# Patient Record
Sex: MALE | Race: WHITE | NOT HISPANIC OR LATINO | Employment: FULL TIME | URBAN - METROPOLITAN AREA
[De-identification: names, ages, dates, MRNs, and addresses within clinical notes are randomized per-mention and may not be internally consistent; named-entity substitution may affect disease eponyms.]

---

## 2018-11-08 ENCOUNTER — OFFICE VISIT (OUTPATIENT)
Dept: URGENT CARE | Facility: CLINIC | Age: 33
End: 2018-11-08
Payer: OTHER MISCELLANEOUS

## 2018-11-08 VITALS
DIASTOLIC BLOOD PRESSURE: 87 MMHG | BODY MASS INDEX: 27.16 KG/M2 | TEMPERATURE: 97 F | SYSTOLIC BLOOD PRESSURE: 139 MMHG | RESPIRATION RATE: 18 BRPM | HEART RATE: 89 BPM | OXYGEN SATURATION: 98 % | WEIGHT: 194 LBS | HEIGHT: 71 IN

## 2018-11-08 DIAGNOSIS — S81.812A LACERATION OF LEFT LOWER EXTREMITY, INITIAL ENCOUNTER: Primary | ICD-10-CM

## 2018-11-08 PROCEDURE — 12002 RPR S/N/AX/GEN/TRNK2.6-7.5CM: CPT | Mod: S$GLB,,, | Performed by: PHYSICIAN ASSISTANT

## 2018-11-08 PROCEDURE — 99204 OFFICE O/P NEW MOD 45 MIN: CPT | Mod: 25,S$GLB,, | Performed by: PHYSICIAN ASSISTANT

## 2018-11-08 NOTE — LETTER
[facility] [address], [address]  [facility] [address]  Phone: [phone]  Fax: [phone]  [facility]: [email or web address]    Pt Name: Rigo Garduno  Injury Date: 11/08/2018   Employee ID:  Date of First Treatment: 11/08/2018   Company: [employer]      Appointment Time: 05:25 PM Arrived: [id number]   Provider: Octavio Monique PA-C Time Out:2965     Office Treatment:   1. Laceration of left lower extremity, initial encounter                     Return Appointment: 1 week for suture removal  RTW as tolerated with attention not to aggravate.

## 2018-11-08 NOTE — PROGRESS NOTES
"Subjective:       Patient ID: Rigo Garduno is a 33 y.o. male.    Vitals:  height is 5' 11" (1.803 m) and weight is 88 kg (194 lb). His oral temperature is 97 °F (36.1 °C). His blood pressure is 139/87 and his pulse is 89. His respiration is 18 and oxygen saturation is 98%.     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 The laceration is located on the left leg. The laceration mechanism was a blunt object. His tetanus status is UTD.     Review of Systems   Constitution: Negative for chills and fever.   HENT: Negative for sore throat.    Eyes: Negative for blurred vision.   Cardiovascular: Negative for chest pain.   Respiratory: Negative for shortness of breath.    Skin: Negative for rash.   Musculoskeletal: Negative for back pain and joint pain.   Gastrointestinal: Negative for abdominal pain, diarrhea, nausea and vomiting.   Neurological: Negative for headaches.   Psychiatric/Behavioral: The patient is not nervous/anxious.        Objective:      Physical Exam   Constitutional: He is oriented to person, place, and time. He appears well-developed and well-nourished. He is cooperative.  Non-toxic appearance. He does not appear ill. No distress.   HENT:   Head: Normocephalic and atraumatic.   Right Ear: Hearing, tympanic membrane, external ear and ear canal normal.   Left Ear: Hearing, tympanic membrane, external ear and ear canal normal.   Nose: Nose normal. No mucosal edema, rhinorrhea or nasal deformity. No epistaxis. Right sinus exhibits no maxillary sinus tenderness and no frontal sinus tenderness. Left sinus exhibits no maxillary sinus tenderness and no frontal sinus tenderness.   Mouth/Throat: Uvula is midline, oropharynx is clear and moist and mucous membranes are normal. No trismus in the jaw. Normal dentition. No uvula swelling. No posterior oropharyngeal erythema.   Eyes: Conjunctivae and lids " are normal. Right eye exhibits no discharge. Left eye exhibits no discharge. No scleral icterus.   Sclera clear bilat   Neck: Trachea normal, normal range of motion, full passive range of motion without pain and phonation normal. Neck supple.   Cardiovascular: Normal rate, regular rhythm, normal heart sounds, intact distal pulses and normal pulses.   Pulmonary/Chest: Effort normal and breath sounds normal. No respiratory distress.   Abdominal: Soft. Normal appearance and bowel sounds are normal. He exhibits no distension, no pulsatile midline mass and no mass. There is no tenderness.   Musculoskeletal: Normal range of motion. He exhibits no edema or deformity.   Neurological: He is alert and oriented to person, place, and time. He exhibits normal muscle tone. Coordination normal.   Skin: Skin is warm and dry. Laceration noted. He is not diaphoretic. No pallor.        Psychiatric: He has a normal mood and affect. His speech is normal and behavior is normal. Judgment and thought content normal. Cognition and memory are normal.   Nursing note and vitals reviewed.      Assessment:       1. Laceration of left lower extremity, initial encounter        Plan:         Laceration of left lower extremity, initial encounter  -     Laceration Repair    Procedures    Laceration Repair  Date/Time: 2018 6:21 PM  Performed by: Octavio Monique PA-C  Authorized by: Octavio Monique PA-C   Consent Done: Yes  Consent: Verbal consent obtained.  Consent given by: patient  Patient identity confirmed:  and name  Body area: lower extremity  Location details: left lower leg  Laceration length: 4 cm  Tendon involvement: none  Nerve involvement: none  Vascular damage: no  Anesthesia: local infiltration    Anesthesia:  Local Anesthetic: lidocaine 1% without epinephrine  Anesthetic total: 4 mL  Preparation: Patient was prepped and draped in the usual sterile fashion.  Irrigation solution: saline  Irrigation method: syringe  Amount of  cleaning: standard  Debridement: none  Degree of undermining: none  Skin closure: 3-0 nylon  Number of sutures: 3  Technique: simple  Approximation: close  Approximation difficulty: simple  Dressing: adhesive bandage  Patient tolerance: Patient tolerated the procedure well with no immediate complications           Patient Instructions       Self-Care for Cuts, Scrapes, and Burns  Cuts, scrapes, and burns are hard to avoid. Most minor injuries can be treated at home. A small wound may threaten your health if it causes severe blood loss or becomes infected. Call your doctor if a wound doesnt heal within a couple of weeks.  When should I call my healthcare provider?  Call your healthcare provider right away if:  · You cant stop the bleeding.  · The wound covers a large area, is deep, or you can see muscles, tendons or bones.  · Your ear or eye is injured or burned.  · The burn is larger than the size of your palm, or is on your neck, face, foot, groin, or your hand.  · A puncture wound is deep or wide, or was caused by a dirty or farnaz object.  · You have signs of infection: fever, pus, pain, or redness.  · It has been 10 years or more since your last tetanus shot.   Caring for cuts, scrapes, and puncture wounds  If youre caring for someone else, remember to protect yourself from illnesses carried in blood and body fluids. Use latex gloves or whatever else is available (a towel, perhaps) as a barrier between you and the blood.  Step 1. Control bleeding  · Apply direct pressure to a cut or scrape to stop bleeding.  · Allow a minor puncture wound to stop bleeding on its own, unless the bleeding is heavy. This may help clean out the wound.  Step 2. Clean the wound  · Kill germs and remove the dirt by washing the wound with warm water and soap.  · Soak a minor puncture wound in warm, sudsy water for several minutes. Repeat this at least 2 times every day.  Step 3. Cover the injury  · Hold the edges of a cut together  with a butterfly bandage.  · Apply antibiotic ointment.  · For a cut or scrape, apply an adhesive bandage or clean gauze. Tape it in place.  · Cover a minor puncture with gauze to absorb drainage and let in air to help with healing.  Treating minor burns  · Cool the burn immediately. Otherwise, the skin continues to hold heat and will keep burning. Use cloths soaked in cool water, place the burned area under a gentle stream of cool water, or submerge the burn in a full sink or bucket.  · Treat a minor burn like you treat a minor cut or scrape. Clean and cover it with a loose dressing.  · Do not put butter, oil, or ointment on a burn. This only seals in heat. After you cool the area, you can apply a moisturizer with Aloe Vera (with or without a numbing agent) to soothe the burn.  · Dont break blisters or pull off skin from a broken blister. This skin helps protect the healing skin underneath.  Date Last Reviewed: 12/1/2016 © 2000-2017 [employer]. [address] [place] [address]. All rights reserved. This information is not intended as a substitute for professional medical care. Always follow your healthcare professional's instructions.    If you were prescribed a narcotic medication, do not drive or operate heavy equipment or machinery while taking these medications.  You must understand that you've received an Urgent Care treatment only and that you may be released before all your medical problems are known or treated. You, the patient, will arrange for follow up care as instructed.  Follow up with your PCP or specialty clinic as directed in the next 1-2 weeks if not improved or as needed.  You can call [phone] to schedule an appointment with the appropriate provider.  If your condition worsens we recommend that you receive another evaluation at the emergency room immediately or contact your primary medical clinics after hours call service to discuss your concerns.  Please return  here or go to the Emergency Department for any concerns or worsening of condition.

## 2018-11-08 NOTE — PROGRESS NOTES
Subjective:       Patient ID: Rigo Garduno is a 33 y.o. male.    Vitals:  vitals were not taken for this visit.     Chief Complaint: Laceration    Laceration        ROS    Objective:      Physical Exam    Assessment:       No diagnosis found.    Plan:         There are no diagnoses linked to this encounter.

## 2018-11-09 NOTE — PROCEDURES
Laceration Repair  Date/Time: 2018 6:21 PM  Performed by: Octavio Monique PA-C  Authorized by: Octavio Monique PA-C   Consent Done: Yes  Consent: Verbal consent obtained.  Consent given by: patient  Patient identity confirmed:  and name  Body area: lower extremity  Location details: left lower leg  Laceration length: 4 cm  Tendon involvement: none  Nerve involvement: none  Vascular damage: no  Anesthesia: local infiltration    Anesthesia:  Local Anesthetic: lidocaine 1% without epinephrine  Anesthetic total: 4 mL  Preparation: Patient was prepped and draped in the usual sterile fashion.  Irrigation solution: saline  Irrigation method: syringe  Amount of cleaning: standard  Debridement: none  Degree of undermining: none  Skin closure: 3-0 nylon  Number of sutures: 3  Technique: simple  Approximation: close  Approximation difficulty: simple  Dressing: adhesive bandage  Patient tolerance: Patient tolerated the procedure well with no immediate complications

## 2018-11-09 NOTE — PATIENT INSTRUCTIONS
Self-Care for Cuts, Scrapes, and Burns  Cuts, scrapes, and burns are hard to avoid. Most minor injuries can be treated at home. A small wound may threaten your health if it causes severe blood loss or becomes infected. Call your doctor if a wound doesnt heal within a couple of weeks.  When should I call my healthcare provider?  Call your healthcare provider right away if:  · You cant stop the bleeding.  · The wound covers a large area, is deep, or you can see muscles, tendons or bones.  · Your ear or eye is injured or burned.  · The burn is larger than the size of your palm, or is on your neck, face, foot, groin, or your hand.  · A puncture wound is deep or wide, or was caused by a dirty or farnaz object.  · You have signs of infection: fever, pus, pain, or redness.  · It has been 10 years or more since your last tetanus shot.   Caring for cuts, scrapes, and puncture wounds  If youre caring for someone else, remember to protect yourself from illnesses carried in blood and body fluids. Use latex gloves or whatever else is available (a towel, perhaps) as a barrier between you and the blood.  Step 1. Control bleeding  · Apply direct pressure to a cut or scrape to stop bleeding.  · Allow a minor puncture wound to stop bleeding on its own, unless the bleeding is heavy. This may help clean out the wound.  Step 2. Clean the wound  · Kill germs and remove the dirt by washing the wound with warm water and soap.  · Soak a minor puncture wound in warm, sudsy water for several minutes. Repeat this at least 2 times every day.  Step 3. Cover the injury  · Hold the edges of a cut together with a butterfly bandage.  · Apply antibiotic ointment.  · For a cut or scrape, apply an adhesive bandage or clean gauze. Tape it in place.  · Cover a minor puncture with gauze to absorb drainage and let in air to help with healing.  Treating minor burns  · Cool the burn immediately. Otherwise, the skin continues to hold heat and will keep  burning. Use cloths soaked in cool water, place the burned area under a gentle stream of cool water, or submerge the burn in a full sink or bucket.  · Treat a minor burn like you treat a minor cut or scrape. Clean and cover it with a loose dressing.  · Do not put butter, oil, or ointment on a burn. This only seals in heat. After you cool the area, you can apply a moisturizer with Aloe Vera (with or without a numbing agent) to soothe the burn.  · Dont break blisters or pull off skin from a broken blister. This skin helps protect the healing skin underneath.  Date Last Reviewed: 12/1/2016  © 5931-6168 [employer]. [address] [place] [address], [place] [address]. All rights reserved. This information is not intended as a substitute for professional medical care. Always follow your healthcare professional's instructions.    If you were prescribed a narcotic medication, do not drive or operate heavy equipment or machinery while taking these medications.  You must understand that you've received an Urgent Care treatment only and that you may be released before all your medical problems are known or treated. You, the patient, will arrange for follow up care as instructed.  Follow up with your PCP or specialty clinic as directed in the next 1-2 weeks if not improved or as needed.  You can call [phone] to schedule an appointment with the appropriate provider.  If your condition worsens we recommend that you receive another evaluation at the emergency room immediately or contact your primary medical clinics after hours call service to discuss your concerns.  Please return here or go to the Emergency Department for any concerns or worsening of condition.

## 2018-11-15 ENCOUNTER — OCCUPATIONAL HEALTH (OUTPATIENT)
Dept: URGENT CARE | Facility: CLINIC | Age: 33
End: 2018-11-15
Payer: OTHER MISCELLANEOUS

## 2018-11-15 VITALS
RESPIRATION RATE: 18 BRPM | DIASTOLIC BLOOD PRESSURE: 90 MMHG | SYSTOLIC BLOOD PRESSURE: 136 MMHG | WEIGHT: 194 LBS | TEMPERATURE: 98 F | BODY MASS INDEX: 27.16 KG/M2 | HEIGHT: 71 IN | OXYGEN SATURATION: 98 % | HEART RATE: 83 BPM

## 2018-11-15 DIAGNOSIS — S81.812D LACERATION OF LEFT LOWER LEG, SUBSEQUENT ENCOUNTER: Primary | ICD-10-CM

## 2018-11-15 DIAGNOSIS — Z48.02 VISIT FOR SUTURE REMOVAL: ICD-10-CM

## 2018-11-15 PROCEDURE — 99024 POSTOP FOLLOW-UP VISIT: CPT | Mod: S$GLB,,, | Performed by: FAMILY MEDICINE

## 2018-11-16 NOTE — PROGRESS NOTES
Subjective:       Patient ID: Rigo Garduno is a 33 y.o. male.    Chief Complaint: Suture / Staple Removal    Pt here for stitch removal and follow up from work injury. Seen on 11/08      Suture / Staple Removal   The sutures were placed 7 to 10 days ago. He tried regular soap and water washings since the wound repair. The treatment provided significant relief. There has been no drainage from the wound. There is no redness present. There is no swelling present. There is no pain present.     Review of Systems   Constitution: Negative for chills and fever.   HENT: Negative for sore throat.    Eyes: Negative for blurred vision.   Cardiovascular: Negative for chest pain.   Respiratory: Negative for shortness of breath.    Skin: Negative for rash.   Musculoskeletal: Negative for back pain and joint pain.   Gastrointestinal: Negative for abdominal pain, diarrhea, nausea and vomiting.   Neurological: Negative for headaches.   Psychiatric/Behavioral: The patient is not nervous/anxious.        Objective:      Physical Exam      Wound healing well. 3 sutures removed. Steri strip placed.   Assessment:       1. Laceration of left lower leg, subsequent encounter    2. Visit for suture removal        Plan:              rtw full duty     No Follow-up on file.

## 2021-05-06 ENCOUNTER — PATIENT MESSAGE (OUTPATIENT)
Dept: RESEARCH | Facility: HOSPITAL | Age: 36
End: 2021-05-06

## 2022-07-15 ENCOUNTER — TELEPHONE (OUTPATIENT)
Dept: GASTROENTEROLOGY | Facility: CLINIC | Age: 37
End: 2022-07-15
Payer: COMMERCIAL

## 2022-07-15 NOTE — TELEPHONE ENCOUNTER
----- Message from Anna Rabago sent at 7/15/2022 12:00 PM CDT -----  Regarding: appointment  Contact: patient  Type:  Sooner Appointment Request    Caller is requesting a sooner appointment.  Caller declined first available appointment listed below.  Caller will not accept being placed on the waitlist and is requesting a message be sent to doctor.    Name of Caller:  patient  When is the first available appointment?  08/08/22  Symptoms:  ER [facility] 07/14/22 internal hemroid  Best Call Back Number:  [phone] (home)   Additional Information:  Patient needs to be seen in one week. Please call patient to advise.Thanks!

## 2022-07-15 NOTE — TELEPHONE ENCOUNTER
Returned call to the patient, patient offered first available appointment, appointment scheduled, patient verbalized understanding of this.

## 2022-08-11 ENCOUNTER — OFFICE VISIT (OUTPATIENT)
Dept: GASTROENTEROLOGY | Facility: CLINIC | Age: 37
End: 2022-08-11
Payer: COMMERCIAL

## 2022-08-11 ENCOUNTER — TELEPHONE (OUTPATIENT)
Dept: GASTROENTEROLOGY | Facility: CLINIC | Age: 37
End: 2022-08-11

## 2022-08-11 VITALS — HEIGHT: 71 IN | BODY MASS INDEX: 25.37 KG/M2 | WEIGHT: 181.19 LBS

## 2022-08-11 DIAGNOSIS — K62.89 RECTAL DISCOMFORT: ICD-10-CM

## 2022-08-11 DIAGNOSIS — K59.00 CONSTIPATION, UNSPECIFIED CONSTIPATION TYPE: Primary | ICD-10-CM

## 2022-08-11 DIAGNOSIS — K62.9 ANAL LESION: ICD-10-CM

## 2022-08-11 DIAGNOSIS — Z87.19 HISTORY OF HEMORRHOIDS: ICD-10-CM

## 2022-08-11 PROCEDURE — 99999 PR PBB SHADOW E&M-EST. PATIENT-LVL III: CPT | Mod: PBBFAC,,, | Performed by: NURSE PRACTITIONER

## 2022-08-11 PROCEDURE — 3008F PR BODY MASS INDEX (BMI) DOCUMENTED: ICD-10-PCS | Mod: CPTII,S$GLB,, | Performed by: NURSE PRACTITIONER

## 2022-08-11 PROCEDURE — 99203 PR OFFICE/OUTPT VISIT, NEW, LEVL III, 30-44 MIN: ICD-10-PCS | Mod: S$GLB,,, | Performed by: NURSE PRACTITIONER

## 2022-08-11 PROCEDURE — 99203 OFFICE O/P NEW LOW 30 MIN: CPT | Mod: S$GLB,,, | Performed by: NURSE PRACTITIONER

## 2022-08-11 PROCEDURE — 1160F RVW MEDS BY RX/DR IN RCRD: CPT | Mod: CPTII,S$GLB,, | Performed by: NURSE PRACTITIONER

## 2022-08-11 PROCEDURE — 4010F ACE/ARB THERAPY RXD/TAKEN: CPT | Mod: CPTII,S$GLB,, | Performed by: NURSE PRACTITIONER

## 2022-08-11 PROCEDURE — 1159F MED LIST DOCD IN RCRD: CPT | Mod: CPTII,S$GLB,, | Performed by: NURSE PRACTITIONER

## 2022-08-11 PROCEDURE — 4010F PR ACE/ARB THEARPY RXD/TAKEN: ICD-10-PCS | Mod: CPTII,S$GLB,, | Performed by: NURSE PRACTITIONER

## 2022-08-11 PROCEDURE — 99999 PR PBB SHADOW E&M-EST. PATIENT-LVL III: ICD-10-PCS | Mod: PBBFAC,,, | Performed by: NURSE PRACTITIONER

## 2022-08-11 PROCEDURE — 1160F PR REVIEW ALL MEDS BY PRESCRIBER/CLIN PHARMACIST DOCUMENTED: ICD-10-PCS | Mod: CPTII,S$GLB,, | Performed by: NURSE PRACTITIONER

## 2022-08-11 PROCEDURE — 1159F PR MEDICATION LIST DOCUMENTED IN MEDICAL RECORD: ICD-10-PCS | Mod: CPTII,S$GLB,, | Performed by: NURSE PRACTITIONER

## 2022-08-11 PROCEDURE — 3008F BODY MASS INDEX DOCD: CPT | Mod: CPTII,S$GLB,, | Performed by: NURSE PRACTITIONER

## 2022-08-11 RX ORDER — HYDROCORTISONE 25 MG/G
CREAM TOPICAL 2 TIMES DAILY
COMMUNITY

## 2022-08-11 RX ORDER — POLYETHYLENE GLYCOL 3350 17 G/17G
17 POWDER, FOR SOLUTION ORAL DAILY
Qty: 510 G | Refills: 2 | Status: SHIPPED | OUTPATIENT
Start: 2022-08-11 | End: 2022-09-10

## 2022-08-11 NOTE — TELEPHONE ENCOUNTER
Called [facility] to [facility] [address]. Pharmacist will hold Rx and patient should call for Rx when it gets closer to time for colonoscopy on 11/10/22.

## 2022-08-11 NOTE — PATIENT INSTRUCTIONS
Constipation (Adult)  Constipation means that you have bowel movements that are less frequent than usual. Stools often become very hard and difficult to pass.  Constipation is very common. At some point in life it affects almost everyone. Since everyone's bowel habits are different, what is constipation to one person may not be to another. Your healthcare provider may do tests to diagnose constipation. It depends on what he or she finds when evaluating you.    Symptoms of constipation include:  Abdominal pain  Bloating  Vomiting  Painful bowel movements  Itching, swelling, bleeding, or pain around the anus  Causes  Constipation can have many causes. These include:  Diet low in fiber  Too much dairy  Not drinking enough liquids  Lack of exercise or physical activity. This is especially true for older adults.  Changes in lifestyle or daily routine, including pregnancy, aging, work, and travel  Frequent use or misuse of laxatives  Ignoring the urge to have a bowel movement or delaying it until later  Medicines, such as certain prescription pain medicines, iron supplements, antacids, certain antidepressants, and calcium supplements  Diseases like irritable bowel syndrome, bowel obstructions, stroke, diabetes, thyroid disease, Parkinson disease, hemorrhoids, and colon cancer  Complications  Potential complications of constipation can include:  Hemorrhoids  Rectal bleeding from hemorrhoids or anal fissures (skin tears)  Hernias  Dependency on laxatives  Chronic constipation  Fecal impaction  Bowel obstruction or perforation  Home care  All treatment should be done after talking with your healthcare provider. This is especially true if you have another medical problems, are taking prescription medicines, or are an older adult. Treatment most often involves lifestyle changes. You may also need medicines. Your healthcare provider will tell you which will work best for you. Follow the advice below to help avoid this problem  in the future.  Lifestyle changes  These lifestyle changes can help prevent constipation:  Diet. Eat a high-fiber diet, with fresh fruit and vegetables, and reduce dairy intake, meats, and processed foods  Fluids. It's important to get enough fluids each day. Drink plenty of water when you eat more fiber. If you are on diet that limits the amount of fluid you can have, talk about this with your healthcare provider.  Regular exercise. Check with your healthcare provider first.  Medications  Take any medicines as directed. Some laxatives are safe to use only every now and then. Others can be taken on a regular basis. Talk with your doctor or pharmacist if you have questions.  Prescription pain medicines can cause constipation. If you are taking this kind of medicine, ask your healthcare provider if you should also take a stool softener.  Medicines you may take to treat constipation include:  Fiber supplements  Stool softeners  Laxatives  Enemas  Rectal suppositories  Follow-up care  Follow up with your healthcare provider if symptoms don't get better in the next few days. You may need to have more tests or see a specialist.  Call 911  Call 911 if any of these occur:  Trouble breathing  Stiff, rigid abdomen that is severely painful to touch  Confusion  Fainting or loss of consciousness  Rapid heart rate  Chest pain  When to seek medical advice  Call your healthcare provider right away if any of these occur:  Fever over 100.4°F (38°C)  Failure to resume normal bowel movements  Pain in your abdomen or back gets worse  Nausea or vomiting  Swelling in your abdomen  Blood in the stool  Black, tarry stool  Involuntary weight loss  Weakness  Date Last Reviewed: 12/30/2015  © 0047-8021 [employer], [employer]. [address] [place] [address], [place] [address]. All rights reserved. This information is not intended as a substitute for professional medical care. Always follow your healthcare professional's instructions.   Abdominal  Pain    Abdominal pain is pain in the stomach or belly area. Everyone has this pain from time to time. In many cases it goes away on its own. But abdominal pain can sometimes be due to a serious problem, such as appendicitis. So its important to know when to seek help.  Causes of abdominal pain  There are many possible causes of abdominal pain. Common causes in adults include:  Constipation, diarrhea, or gas  Stomach acid flowing back up into the esophagus (acid reflux or heartburn)  Severe acid reflux, called GERD (gastroesophageal reflux disease)  A sore in the lining of the stomach or small intestine (peptic ulcer)  Inflammation of the gallbladder, liver, or pancreas  Gallstones or kidney stones  Appendicitis   Intestinal blockage   An internal organ pushing through a muscle or other tissue (hernia)  Urinary tract infections  In women, menstrual cramps, fibroids, or endometriosis  Inflammation or infection of the intestines  Diagnosing the cause of abdominal pain  Your healthcare provider will do a physical exam help find the cause of your pain. If needed, tests will be ordered. Belly pain has many possible causes. So it can be hard to find the reason for your pain. Giving details about your pain can help. Tell your provider where and when you feel the pain, and what makes it better or worse. Also let your provider know if you have other symptoms such as:  Fever  Tiredness  Upset stomach (nausea)  Vomiting  Changes in bathroom habits  Treating abdominal pain  Some causes of pain need emergency medical treatment right away. These include appendicitis or a bowel blockage. Other problems can be treated with rest, fluids, or medicines. Your healthcare provider can give you specific instructions for treatment or self-care based on what is causing your pain.  If you have vomiting or diarrhea, sip water or other clear fluids. When you are ready to eat solid foods again, start with small amounts of easy-to-digest,  low-fat foods. These include apple sauce, toast, or crackers.   When to seek medical care  Call 911 or go to the hospital right away if you:  Cant pass stool and are vomiting  Are vomiting blood or have bloody diarrhea or black, tarry diarrhea  Have chest, neck, or shoulder pain  Feel like you might pass out  Have pain in your shoulder blades with nausea  Have sudden, severe belly pain  Have new, severe pain unlike any you have felt before  Have a belly that is rigid, hard, and tender to touch  Call your healthcare provider if you have:  Pain for more than 5 days  Bloating for more than 2 days  Diarrhea for more than 5 days  A fever of 100.4°F (38.0°C) or higher, or as directed by your provider  Pain that gets worse  Weight loss for no reason  Continued lack of appetite  Blood in your stool  How to prevent abdominal pain  Here are some tips to help prevent abdominal pain:  Eat smaller amounts of food at one time.  Avoid greasy, fried, or other high-fat foods.  Avoid foods that give you gas.  Exercise regularly.  Drink plenty of fluids.  To help prevent GERD symptoms:  Quit smoking.  Reduce alcohol and certain foods that increase stomach acid.  Avoid aspirin and over-the-counter pain and fever medicines (NSAIDS or nonsteroidal anti-inflammatory drugs), if possible  Lose extra weight.  Finish eating at least 2 hours before you go to bed or lie down.  Raise the head of your bed.  Date Last Reviewed: 7/1/2016  © 7387-8345 [employer]. [address] [place] [address], [place] [address]. All rights reserved. This information is not intended as a substitute for professional medical care. Always follow your healthcare professional's instructions.           Hemorrhoids    Hemorrhoids are swollen and inflamed veins inside the rectum and near the anus. The rectum is the last several inches of the colon. The anus is the passage between the rectum and the outside of the body.  Causes  The veins can become swollen due to  increased pressure in them. This is most often caused by:  Chronic constipation or diarrhea  Straining when having a bowel movement  Sitting too long on the toilet  A low-fiber diet  Pregnancy  Symptoms  Bleeding from the rectum (this may be noticeable after bowel movements)  Lump near the anus  Itching around the anus  Pain around the anus  There are different types of hemorrhoids. Depending on the type you have and the severity, you may be able to treat yourself at home. In some cases, a procedure may be the best treatment option. Your healthcare provider can tell you more about this, if needed.  Home care  General care  To get relief from pain or itching, try:  Topical products. Your healthcare provider may prescribe or recommend creams, ointments, or pads that can be applied to the hemorrhoid. Use these exactly as directed.  Medicines. Your healthcare provider may recommend stool softeners, suppositories, or laxatives to help manage constipation. Use these exactly as directed.  Sitz baths. A sitz bath involves sitting in a few inches of warm bath water. Be careful not to make the water so hot that you burn yourself--test it before sitting in it. Soak for about 10 to 15 minutes a few times a day. This may help relieve pain.  Tips to help prevent hemorrhoids  Eat more fiber. Fiber adds bulk to stool and absorbs water as it moves through your colon. This makes stool softer and easier to pass.  Increase the fiber in your diet with more fiber-rich foods. These include fresh fruit, vegetables, and whole grains.  Take a fiber supplement or bulking agent, if advised to by your provider. These include products such as psyllium or methylcellulose.  Drink plenty of water, if directed to by your provider. This can help keep stool soft.  Be more active. Frequent exercise aids digestion and helps prevent constipation. It may also help make bowel movements more regular.  Dont strain during bowel movements. This can make  hemorrhoids more likely. Also, dont sit on the toilet for long periods of time.  Follow-up care  Follow up with your healthcare provider, or as advised. If a culture or imaging tests were done, you will be notified of the results when they are ready. This may take a few days or longer.  When to seek medical advice  Call your healthcare provider right away if any of these occur:  Increased bleeding from the rectum  Increased pain around the rectum or anus  Weakness or dizziness  Call 911  Call 911 or return to the emergency department right away if any of these occur:  Trouble breathing or swallowing  Fainting or loss of consciousness  Unusually fast heart rate  Vomiting blood  Large amounts of blood in stool  Date Last Reviewed: 6/22/2015 © 2000-2017 [email or web address]. [address] [place] [address], [place] [address]. All rights reserved. This information is not intended as a substitute for professional medical care. Always follow your healthcare professional's instructions.

## 2022-08-11 NOTE — PROGRESS NOTES
Subjective:       Patient ID: Rigo Garduno is a 36 y.o. male Body mass index is 25.27 kg/m².    Chief Complaint: Constipation    This patient is new to me.     Patient reports he saws another GI group's Nurse practitioner previously and all they did was prescribe him a cream, which didn't help. Went to the ER for it, diagnosed with internal hemorrhoid and was prescribed proctofoam but was not able to get it due to his pharmacy not having it. Patient reports he would like to try a different pharmacy to try to fill the prescription for the foam but he needs a new prescription.    GI Problem  Primary symptoms do not include fever, weight loss, fatigue, abdominal pain, nausea, vomiting, diarrhea, melena, hematemesis, hematochezia or dysuria.   The illness is also significant for constipation (started ~1 year ago, bowel movements are once daily with taking metamucil TID, formed stool to pellet-like stool) and tenesmus. The illness does not include chills, dysphagia or odynophagia. Significant associated medical issues include hemorrhoids (hydrocortisone cream PRN- no relief; PAST TREATMENT: preparation H- no relief).     Review of Systems   Constitutional: Negative for appetite change, chills, fatigue, fever and weight loss.   HENT: Negative for sore throat and trouble swallowing.    Respiratory: Negative for cough, choking and shortness of breath.    Cardiovascular: Negative for chest pain.   Gastrointestinal: Positive for constipation (started ~1 year ago, bowel movements are once daily with taking metamucil TID, formed stool to pellet-like stool). Negative for abdominal pain, anal bleeding, blood in stool, diarrhea, dysphagia, hematemesis, hematochezia, melena, nausea, rectal pain (denies pain but mild discomfort and feels a nodule to his anus) and vomiting.        Patient reports he thinks he got hemorrhoids from working years at his current job, , due to frequent heavy lifting   Genitourinary: Negative  for difficulty urinating, dysuria and flank pain.   Neurological: Negative for weakness.       Past Medical History:   Diagnosis Date    Hypertension 5/27/2016     History reviewed. No pertinent surgical history.  Family History   Problem Relation Age of Onset    No Known Problems Mother     No Known Problems Father     Colon cancer Neg Hx     Crohn's disease Neg Hx     Ulcerative colitis Neg Hx      Social History     Tobacco Use    Smoking status: Former Smoker    Smokeless tobacco: Never Used   Substance Use Topics    Alcohol use: Yes     Alcohol/week: 12.0 standard drinks     Types: 12 Cans of beer per week    Drug use: Never     Wt Readings from Last 10 Encounters:   08/11/22 82.2 kg (181 lb 3.5 oz)   07/14/22 83.2 kg (183 lb 6.8 oz)   08/06/21 84.4 kg (186 lb 1.1 oz)   03/14/21 84.7 kg (186 lb 11.7 oz)   10/10/19 79.4 kg (175 lb)   11/15/18 88 kg (194 lb)   11/08/18 88 kg (194 lb)   05/27/16 88 kg (194 lb)     Reviewed prior medical records including 7/14/2022 ER visit note; 7/14/2022 occult stool negative, POCT CBC & CMP.    Objective:      Physical Exam  Vitals and nursing note reviewed.   Constitutional:       General: He is not in acute distress.     Appearance: Normal appearance. He is well-developed. He is not diaphoretic.   HENT:      Mouth/Throat:      Comments: Patient is wearing a face mask, which covers patient's mouth and nose, due to COVID 19 concerns.  Eyes:      General: No scleral icterus.     Conjunctiva/sclera: Conjunctivae normal.   Pulmonary:      Effort: Pulmonary effort is normal. No respiratory distress.      Breath sounds: Normal breath sounds. No wheezing.   Abdominal:      General: Bowel sounds are normal. There is no distension or abdominal bruit.      Palpations: Abdomen is soft. Abdomen is not rigid. There is no mass.      Tenderness: There is no abdominal tenderness. There is no guarding or rebound. Negative signs include Burgess's sign and McBurney's sign.      Comments:  Deferred rectal examination   Skin:     General: Skin is warm and dry.      Coloration: Skin is not pale.      Findings: No erythema or rash.      Comments: Non-jaundiced   Neurological:      Mental Status: He is alert and oriented to person, place, and time.   Psychiatric:         Behavior: Behavior normal.         Thought Content: Thought content normal.         Judgment: Judgment normal.         Assessment:       1. Constipation, unspecified constipation type    2. Anal lesion    3. History of hemorrhoids    4. Rectal discomfort        Plan:       Constipation, unspecified constipation type  -  START   polyethylene glycol (GLYCOLAX) 17 gram/dose powder; Take 17 g by mouth once daily. Mix with 8 oz of water/liquid.  Dispense: 510 g; Refill: 2  Recommend daily exercise as tolerated, adequate water intake (six 8-oz glasses of water daily), and high fiber diet. CONTINUE OTC fiber supplement, Metamucil (take as directed, separate from other oral medications by >2 hours).  -Recommend taking an OTC stool softener such as Colace as directed to avoid hard stools and straining with bowel movements PRN  -If still no improvement with these measures, call/follow-up  - schedule Colonoscopy, discussed procedure with the patient, including risks and benefits, patient verbalized understanding    Anal lesion  -     Ambulatory referral/consult to Colorectal Surgery ([facility]); Future; Expected date: 08/18/2022  - schedule Colonoscopy, discussed procedure with the patient, including risks and benefits, patient verbalized understanding    History of hemorrhoids  -     Ambulatory referral/consult to Colorectal Surgery ([facility]); Future; Expected date: 08/18/2022  -   REFILL  hydrocortisone-pramoxine (PROCTOFOAM-HS) rectal foam; Place 1 applicator rectally 2 (two) times daily.  Dispense: 60 applicator; Refill: 0  - avoid constipation and straining with bowel movements; try using an OTC stool softener as directed and increase  fiber in diet (20-30 grams daily)/OTC fiber supplement such as metamucil (take as directed)  - recommend SITZ baths    Rectal discomfort  -   REFILL  hydrocortisone-pramoxine (PROCTOFOAM-HS) rectal foam; Place 1 applicator rectally 2 (two) times daily.  Dispense: 60 applicator; Refill: 0  - schedule Colonoscopy, discussed procedure with the patient, including risks and benefits, patient verbalized understanding    Follow up in about 1 month (around 9/11/2022), or if symptoms worsen or fail to improve.      If no improvement in symptoms or symptoms worsen, call/follow-up at clinic or go to ER.        33 minutes of total time spent on the encounter, which includes face to face time and non-face to face time preparing to see the patient (e.g., review of tests), Obtaining and/or reviewing separately obtained history, Documenting clinical information in the electronic or other health record, Independently interpreting results (not separately reported) and communicating results to the patient/family/caregiver, or Care coordination (not separately reported).

## 2022-08-18 ENCOUNTER — TELEPHONE (OUTPATIENT)
Dept: SURGERY | Facility: CLINIC | Age: 37
End: 2022-08-18
Payer: COMMERCIAL

## 2022-08-18 NOTE — TELEPHONE ENCOUNTER
I called patient to schedule him to see Dr. Salas on Thursday, 9/1/22 @ 8:45am in [place].  Kishore

## 2022-08-18 NOTE — TELEPHONE ENCOUNTER
----- Message from Remigio May sent at 8/18/2022 11:38 AM CDT -----  Contact: pt at [phone]  Type:  Patient Returning Call    Who Called:  pt  Who Left Message for Patient:  Kishore  Does the patient know what this is regarding?:  yes  Best Call Back Number:  [phone]  Additional Information:  pt is calling the office to return a call back to Kishore. Please call back and advise.

## 2022-08-30 ENCOUNTER — OFFICE VISIT (OUTPATIENT)
Dept: PAIN MEDICINE | Facility: CLINIC | Age: 37
End: 2022-08-30
Payer: COMMERCIAL

## 2022-08-30 VITALS
HEIGHT: 71 IN | DIASTOLIC BLOOD PRESSURE: 89 MMHG | HEART RATE: 79 BPM | WEIGHT: 183.75 LBS | BODY MASS INDEX: 25.73 KG/M2 | SYSTOLIC BLOOD PRESSURE: 132 MMHG

## 2022-08-30 DIAGNOSIS — M54.2 NECK PAIN: Primary | ICD-10-CM

## 2022-08-30 PROCEDURE — 99999 PR PBB SHADOW E&M-EST. PATIENT-LVL IV: CPT | Mod: PBBFAC,,, | Performed by: PHYSICIAN ASSISTANT

## 2022-08-30 PROCEDURE — 1160F RVW MEDS BY RX/DR IN RCRD: CPT | Mod: CPTII,S$GLB,, | Performed by: PHYSICIAN ASSISTANT

## 2022-08-30 PROCEDURE — 1159F PR MEDICATION LIST DOCUMENTED IN MEDICAL RECORD: ICD-10-PCS | Mod: CPTII,S$GLB,, | Performed by: PHYSICIAN ASSISTANT

## 2022-08-30 PROCEDURE — 3075F SYST BP GE 130 - 139MM HG: CPT | Mod: CPTII,S$GLB,, | Performed by: PHYSICIAN ASSISTANT

## 2022-08-30 PROCEDURE — 3008F PR BODY MASS INDEX (BMI) DOCUMENTED: ICD-10-PCS | Mod: CPTII,S$GLB,, | Performed by: PHYSICIAN ASSISTANT

## 2022-08-30 PROCEDURE — 99999 PR PBB SHADOW E&M-EST. PATIENT-LVL IV: ICD-10-PCS | Mod: PBBFAC,,, | Performed by: PHYSICIAN ASSISTANT

## 2022-08-30 PROCEDURE — 3008F BODY MASS INDEX DOCD: CPT | Mod: CPTII,S$GLB,, | Performed by: PHYSICIAN ASSISTANT

## 2022-08-30 PROCEDURE — 3079F PR MOST RECENT DIASTOLIC BLOOD PRESSURE 80-89 MM HG: ICD-10-PCS | Mod: CPTII,S$GLB,, | Performed by: PHYSICIAN ASSISTANT

## 2022-08-30 PROCEDURE — 1159F MED LIST DOCD IN RCRD: CPT | Mod: CPTII,S$GLB,, | Performed by: PHYSICIAN ASSISTANT

## 2022-08-30 PROCEDURE — 99204 PR OFFICE/OUTPT VISIT, NEW, LEVL IV, 45-59 MIN: ICD-10-PCS | Mod: S$GLB,,, | Performed by: PHYSICIAN ASSISTANT

## 2022-08-30 PROCEDURE — 3079F DIAST BP 80-89 MM HG: CPT | Mod: CPTII,S$GLB,, | Performed by: PHYSICIAN ASSISTANT

## 2022-08-30 PROCEDURE — 1160F PR REVIEW ALL MEDS BY PRESCRIBER/CLIN PHARMACIST DOCUMENTED: ICD-10-PCS | Mod: CPTII,S$GLB,, | Performed by: PHYSICIAN ASSISTANT

## 2022-08-30 PROCEDURE — 3075F PR MOST RECENT SYSTOLIC BLOOD PRESS GE 130-139MM HG: ICD-10-PCS | Mod: CPTII,S$GLB,, | Performed by: PHYSICIAN ASSISTANT

## 2022-08-30 PROCEDURE — 4010F PR ACE/ARB THEARPY RXD/TAKEN: ICD-10-PCS | Mod: CPTII,S$GLB,, | Performed by: PHYSICIAN ASSISTANT

## 2022-08-30 PROCEDURE — 4010F ACE/ARB THERAPY RXD/TAKEN: CPT | Mod: CPTII,S$GLB,, | Performed by: PHYSICIAN ASSISTANT

## 2022-08-30 PROCEDURE — 99204 OFFICE O/P NEW MOD 45 MIN: CPT | Mod: S$GLB,,, | Performed by: PHYSICIAN ASSISTANT
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
## 2022-09-01 ENCOUNTER — HOSPITAL ENCOUNTER (OUTPATIENT)
Dept: RADIOLOGY | Facility: HOSPITAL | Age: 37
Discharge: HOME | End: 2022-09-01
Attending: PHYSICIAN ASSISTANT
Payer: COMMERCIAL

## 2022-09-01 DIAGNOSIS — M54.2 NECK PAIN: ICD-10-CM

## 2022-09-01 PROCEDURE — 72052 XR CERVICAL SPINE 5 VIEW WITH FLEX AND EXT: ICD-10-PCS | Mod: 26,,, | Performed by: RADIOLOGY

## 2022-09-01 PROCEDURE — 72052 X-RAY EXAM NECK SPINE 6/>VWS: CPT | Mod: 26,,, | Performed by: RADIOLOGY

## 2022-09-01 PROCEDURE — 72052 X-RAY EXAM NECK SPINE 6/>VWS: CPT | Mod: TC,FY,PO

## 2022-09-02 ENCOUNTER — TELEPHONE (OUTPATIENT)
Dept: PAIN MEDICINE | Facility: CLINIC | Age: 37
End: 2022-09-02
Payer: COMMERCIAL

## 2022-09-02 NOTE — TELEPHONE ENCOUNTER
----- Message from Filomena Zeng PA-C sent at 9/2/2022  4:12 PM CDT -----  Results Reviewed.  Please call with below:    Neck xrays reviewed.  Bone structure looks good.  No significant arthritic changes.  There is evidence of some muscle spasms.  Proceed with PT.

## 2022-09-02 NOTE — TELEPHONE ENCOUNTER
I called Kell Shaan to give him the results of the neck x-ray as per Filomena Zeng, got voicemail, left return call message.

## 2022-09-13 ENCOUNTER — OFFICE VISIT (OUTPATIENT)
Dept: SURGERY | Facility: CLINIC | Age: 37
End: 2022-09-13
Payer: COMMERCIAL

## 2022-09-13 VITALS
BODY MASS INDEX: 26.2 KG/M2 | DIASTOLIC BLOOD PRESSURE: 83 MMHG | WEIGHT: 187.81 LBS | HEART RATE: 101 BPM | SYSTOLIC BLOOD PRESSURE: 139 MMHG

## 2022-09-13 DIAGNOSIS — K62.89 PERIANAL MASS: Primary | ICD-10-CM

## 2022-09-13 DIAGNOSIS — K61.2 ANORECTAL ABSCESS: ICD-10-CM

## 2022-09-13 PROCEDURE — 99999 PR PBB SHADOW E&M-EST. PATIENT-LVL II: CPT | Mod: PBBFAC,,, | Performed by: STUDENT IN AN ORGANIZED HEALTH CARE EDUCATION/TRAINING PROGRAM

## 2022-09-13 PROCEDURE — 99999 PR PBB SHADOW E&M-EST. PATIENT-LVL II: ICD-10-PCS | Mod: PBBFAC,,, | Performed by: STUDENT IN AN ORGANIZED HEALTH CARE EDUCATION/TRAINING PROGRAM

## 2022-09-13 PROCEDURE — 3075F PR MOST RECENT SYSTOLIC BLOOD PRESS GE 130-139MM HG: ICD-10-PCS | Mod: CPTII,S$GLB,, | Performed by: STUDENT IN AN ORGANIZED HEALTH CARE EDUCATION/TRAINING PROGRAM

## 2022-09-13 PROCEDURE — 4010F PR ACE/ARB THEARPY RXD/TAKEN: ICD-10-PCS | Mod: CPTII,S$GLB,, | Performed by: STUDENT IN AN ORGANIZED HEALTH CARE EDUCATION/TRAINING PROGRAM

## 2022-09-13 PROCEDURE — 3075F SYST BP GE 130 - 139MM HG: CPT | Mod: CPTII,S$GLB,, | Performed by: STUDENT IN AN ORGANIZED HEALTH CARE EDUCATION/TRAINING PROGRAM

## 2022-09-13 PROCEDURE — 4010F ACE/ARB THERAPY RXD/TAKEN: CPT | Mod: CPTII,S$GLB,, | Performed by: STUDENT IN AN ORGANIZED HEALTH CARE EDUCATION/TRAINING PROGRAM

## 2022-09-13 PROCEDURE — 1159F MED LIST DOCD IN RCRD: CPT | Mod: CPTII,S$GLB,, | Performed by: STUDENT IN AN ORGANIZED HEALTH CARE EDUCATION/TRAINING PROGRAM

## 2022-09-13 PROCEDURE — 99203 PR OFFICE/OUTPT VISIT, NEW, LEVL III, 30-44 MIN: ICD-10-PCS | Mod: S$GLB,,, | Performed by: STUDENT IN AN ORGANIZED HEALTH CARE EDUCATION/TRAINING PROGRAM

## 2022-09-13 PROCEDURE — 3079F DIAST BP 80-89 MM HG: CPT | Mod: CPTII,S$GLB,, | Performed by: STUDENT IN AN ORGANIZED HEALTH CARE EDUCATION/TRAINING PROGRAM

## 2022-09-13 PROCEDURE — 99203 OFFICE O/P NEW LOW 30 MIN: CPT | Mod: S$GLB,,, | Performed by: STUDENT IN AN ORGANIZED HEALTH CARE EDUCATION/TRAINING PROGRAM

## 2022-09-13 PROCEDURE — 3008F BODY MASS INDEX DOCD: CPT | Mod: CPTII,S$GLB,, | Performed by: STUDENT IN AN ORGANIZED HEALTH CARE EDUCATION/TRAINING PROGRAM

## 2022-09-13 PROCEDURE — 1159F PR MEDICATION LIST DOCUMENTED IN MEDICAL RECORD: ICD-10-PCS | Mod: CPTII,S$GLB,, | Performed by: STUDENT IN AN ORGANIZED HEALTH CARE EDUCATION/TRAINING PROGRAM

## 2022-09-13 PROCEDURE — 3008F PR BODY MASS INDEX (BMI) DOCUMENTED: ICD-10-PCS | Mod: CPTII,S$GLB,, | Performed by: STUDENT IN AN ORGANIZED HEALTH CARE EDUCATION/TRAINING PROGRAM

## 2022-09-13 PROCEDURE — 3079F PR MOST RECENT DIASTOLIC BLOOD PRESSURE 80-89 MM HG: ICD-10-PCS | Mod: CPTII,S$GLB,, | Performed by: STUDENT IN AN ORGANIZED HEALTH CARE EDUCATION/TRAINING PROGRAM

## 2022-09-13 NOTE — PROGRESS NOTES
History & Physical    SUBJECTIVE:     History of Present Illness:  Patient is a 36 y.o. male presents with an intermittent mass near the anus.  It swells occasionally.  It is worse after being sedentary for a prolonged period of time.  Denies anal bleeding.  No anal pain.  Patient is on a daily fiber supplement.    No chief complaint on file.      Review of patient's allergies indicates:  No Known Allergies    Current Outpatient Medications   Medication Sig Dispense Refill    hydrocortisone (ANUSOL-HC) 2.5 % rectal cream Place rectally 2 (two) times daily.      levocetirizine (XYZAL) 5 MG tablet Take 5 mg by mouth daily as needed for Allergies.      phenyleph-min oil-petrolatum (PREPARATION H) 0.25-14-74.9 % Oint Place 1 applicator rectally 4 (four) times daily as needed.      psyllium (METAMUCIL) powder Take 1 packet by mouth 3 (three) times daily.      quinapril (ACCUPRIL) 5 MG tablet Take 10 mg by mouth once daily.   5     No current facility-administered medications for this visit.       Past Medical History:   Diagnosis Date    Hypertension 5/27/2016     No past surgical history on file.  Family History   Problem Relation Age of Onset    No Known Problems Mother     No Known Problems Father     Colon cancer Neg Hx     Crohn's disease Neg Hx     Ulcerative colitis Neg Hx      Social History     Tobacco Use    Smoking status: Former    Smokeless tobacco: Never   Substance Use Topics    Alcohol use: Yes     Alcohol/week: 12.0 standard drinks     Types: 12 Cans of beer per week    Drug use: Never        Review of Systems:  Review of Systems   Constitutional: Negative.  Negative for fatigue and fever.   HENT: Negative.     Eyes: Negative.    Respiratory: Negative.  Negative for shortness of breath.    Cardiovascular: Negative.  Negative for chest pain.   Gastrointestinal: Negative.    Endocrine: Negative.    Genitourinary: Negative.    Musculoskeletal: Negative.    Skin: Negative.    Allergic/Immunologic: Negative.     Neurological: Negative.    Hematological: Negative.    Psychiatric/Behavioral: Negative.       OBJECTIVE:     Vital Signs (Most Recent)  Pulse: 101 (09/13/22 0808)  BP: 139/83 (09/13/22 0808)     85.2 kg (187 lb 13.3 oz)     Physical Exam:  Physical Exam  Constitutional:       General: He is not in acute distress.     Appearance: Normal appearance. He is not ill-appearing, toxic-appearing or diaphoretic.   HENT:      Head: Normocephalic.      Nose: Nose normal.   Eyes:      Conjunctiva/sclera: Conjunctivae normal.   Cardiovascular:      Rate and Rhythm: Normal rate and regular rhythm.   Pulmonary:      Effort: Pulmonary effort is normal.   Abdominal:      Palpations: Abdomen is soft.   Genitourinary:     Comments: Normal external anal exam.  The area of concern seems to be at the 6 o'clock position, more in the perineum  Musculoskeletal:         General: Normal range of motion.      Cervical back: Normal range of motion.   Skin:     General: Skin is warm.   Neurological:      General: No focal deficit present.      Mental Status: He is alert.   Psychiatric:         Mood and Affect: Mood normal.         ASSESSMENT/PLAN:     36-year-old male with a perianal mass.  Does not seem to be an internal hemorrhoid on palpation    PLAN:  Recommend proceeding with a CT scan with IV contrast for additional evaluation.  Will refer to colorectal surgery for 2nd opinion.

## 2022-09-15 ENCOUNTER — TELEPHONE (OUTPATIENT)
Dept: SURGERY | Facility: CLINIC | Age: 37
End: 2022-09-15
Payer: COMMERCIAL

## 2022-09-15 NOTE — TELEPHONE ENCOUNTER
PARAS @ 4:51pm for patient to call back to schedule a CT Pelvis with contrast then schedule patient to see Dr. Salas 1 week after for the anal lesion.  Kishore

## 2022-09-16 NOTE — TELEPHONE ENCOUNTER
I called patient and he has the CT Scan scheduled on 9/23/22 @ 1:30pm in [place].  I scheduled him to see Dr. Salas on Thursday, 10/6/22 @ 8:30am, but told him to come in @ 8:15am.  Kishore

## 2022-09-23 ENCOUNTER — TELEPHONE (OUTPATIENT)
Dept: SURGERY | Facility: CLINIC | Age: 37
End: 2022-09-23
Payer: COMMERCIAL

## 2022-09-23 NOTE — TELEPHONE ENCOUNTER
----- Message from Nicolette Violeta sent at 9/23/2022 12:21 PM CDT -----  Regarding: [facility]  Name of Who is Calling: BHANU LARA [[id number]] [facility] ( [facility])      What is the request in detail: requesting ct scan orders be sent to them. Please advise       Can the clinic reply by [facility]: No      What Number to Call Back if not in [facility]: [phone] fax # [phone]

## 2022-09-23 NOTE — TELEPHONE ENCOUNTER
I called patient and he wants the CT Scan done at [facility], so his insurance will pay for it.  I'll fax the order to Adilene @ [facility] [phone].  Adilene will stacey patient to schedule the CT Scan.  Kishore

## 2022-10-06 ENCOUNTER — OFFICE VISIT (OUTPATIENT)
Dept: SURGERY | Facility: CLINIC | Age: 37
End: 2022-10-06
Payer: COMMERCIAL

## 2022-10-06 VITALS
WEIGHT: 184.75 LBS | TEMPERATURE: 99 F | HEART RATE: 94 BPM | DIASTOLIC BLOOD PRESSURE: 84 MMHG | BODY MASS INDEX: 25.86 KG/M2 | SYSTOLIC BLOOD PRESSURE: 132 MMHG | HEIGHT: 71 IN

## 2022-10-06 DIAGNOSIS — R19.8 RECTAL PRESSURE: Primary | ICD-10-CM

## 2022-10-06 PROCEDURE — 1159F PR MEDICATION LIST DOCUMENTED IN MEDICAL RECORD: ICD-10-PCS | Mod: CPTII,S$GLB,, | Performed by: SURGERY

## 2022-10-06 PROCEDURE — 99999 PR PBB SHADOW E&M-EST. PATIENT-LVL III: CPT | Mod: PBBFAC,,, | Performed by: SURGERY

## 2022-10-06 PROCEDURE — 4010F PR ACE/ARB THEARPY RXD/TAKEN: ICD-10-PCS | Mod: CPTII,S$GLB,, | Performed by: SURGERY

## 2022-10-06 PROCEDURE — 46600 PR DIAG2STIC A2SCOPY: ICD-10-PCS | Mod: S$GLB,,, | Performed by: SURGERY

## 2022-10-06 PROCEDURE — 99213 PR OFFICE/OUTPT VISIT, EST, LEVL III, 20-29 MIN: ICD-10-PCS | Mod: 25,S$GLB,, | Performed by: SURGERY

## 2022-10-06 PROCEDURE — 4010F ACE/ARB THERAPY RXD/TAKEN: CPT | Mod: CPTII,S$GLB,, | Performed by: SURGERY

## 2022-10-06 PROCEDURE — 3075F PR MOST RECENT SYSTOLIC BLOOD PRESS GE 130-139MM HG: ICD-10-PCS | Mod: CPTII,S$GLB,, | Performed by: SURGERY

## 2022-10-06 PROCEDURE — 1159F MED LIST DOCD IN RCRD: CPT | Mod: CPTII,S$GLB,, | Performed by: SURGERY

## 2022-10-06 PROCEDURE — 46600 DIAGNOSTIC ANOSCOPY SPX: CPT | Mod: S$GLB,,, | Performed by: SURGERY

## 2022-10-06 PROCEDURE — 3008F BODY MASS INDEX DOCD: CPT | Mod: CPTII,S$GLB,, | Performed by: SURGERY

## 2022-10-06 PROCEDURE — 3075F SYST BP GE 130 - 139MM HG: CPT | Mod: CPTII,S$GLB,, | Performed by: SURGERY

## 2022-10-06 PROCEDURE — 3079F DIAST BP 80-89 MM HG: CPT | Mod: CPTII,S$GLB,, | Performed by: SURGERY

## 2022-10-06 PROCEDURE — 3008F PR BODY MASS INDEX (BMI) DOCUMENTED: ICD-10-PCS | Mod: CPTII,S$GLB,, | Performed by: SURGERY

## 2022-10-06 PROCEDURE — 99999 PR PBB SHADOW E&M-EST. PATIENT-LVL III: ICD-10-PCS | Mod: PBBFAC,,, | Performed by: SURGERY

## 2022-10-06 PROCEDURE — 99213 OFFICE O/P EST LOW 20 MIN: CPT | Mod: 25,S$GLB,, | Performed by: SURGERY

## 2022-10-06 PROCEDURE — 3079F PR MOST RECENT DIASTOLIC BLOOD PRESSURE 80-89 MM HG: ICD-10-PCS | Mod: CPTII,S$GLB,, | Performed by: SURGERY

## 2022-10-06 RX ORDER — QUINAPRIL 10 MG/1
10 TABLET ORAL DAILY
COMMUNITY
Start: 2022-08-26

## 2022-10-06 RX ORDER — CETIRIZINE HYDROCHLORIDE 10 MG/1
10 TABLET ORAL DAILY
COMMUNITY

## 2022-10-06 NOTE — PROGRESS NOTES
36-year-old gentleman presents to my office for evaluation of rectal lesion.  Patient has previously seen Dr. Edge.  He notes that when he stands or strains with his bowel movements he can feel something in the perianal area.  Notes because of pressure.  Under sharp stabbing pain more of an annoyance.  No bleeding no changes in bowel habits.  He is otherwise healthy with no significant medical or surgical history.    AFVSS  AAOx3  Soft/nt/nd  Rectal exam: External exam demonstrates no obvious lesions or abnormalities externally.  Digital rectal exam with slightly increased sphincter tone.  No palpable masses there were some palpable findings.  Anoscopy performed demonstrating minimal internal hemorrhoids in all 3 columns.  When asked where lesion is patient points palpates his perianal area at the sphincter complex anteriorly.    A/P:  Pressure    Discussion with patient.  Explained to him and assured him that his CT scan of the pelvis was normal.  On exam I find no worrisome lesions or abnormalities.  I suspect what he is palpating is simply is sphincter complex.  I have encouraged daily fiber use and to follow up with me on a p.r.n. basis

## 2022-11-03 ENCOUNTER — TELEPHONE (OUTPATIENT)
Dept: GASTROENTEROLOGY | Facility: CLINIC | Age: 37
End: 2022-11-03
Payer: COMMERCIAL

## 2022-11-03 NOTE — TELEPHONE ENCOUNTER
"Spoke with pt. Pt canceled c-scope as he had a recent CT scan & it was "perfect". Pt will call back to reschedule if needed.   "

## 2022-11-03 NOTE — TELEPHONE ENCOUNTER
----- Message from Gladis Guadarrama sent at 11/3/2022 10:34 AM CDT -----  Contact: patient  Type: Needs Medical Advice  Who Called:  patient  Best Call Back Number: [phone] (home)   Additional Information: patient is calling to cancel his upcoming colonoscopy

## 2022-11-14 ENCOUNTER — OFFICE VISIT (OUTPATIENT)
Dept: OTOLARYNGOLOGY | Facility: CLINIC | Age: 37
End: 2022-11-14
Payer: COMMERCIAL

## 2022-11-14 VITALS — BODY MASS INDEX: 26.29 KG/M2 | WEIGHT: 187.81 LBS | HEIGHT: 71 IN

## 2022-11-14 DIAGNOSIS — R42 DIZZINESS: ICD-10-CM

## 2022-11-14 DIAGNOSIS — J34.2 DNS (DEVIATED NASAL SEPTUM): ICD-10-CM

## 2022-11-14 DIAGNOSIS — J34.89 NASAL OBSTRUCTION: Primary | ICD-10-CM

## 2022-11-14 PROCEDURE — 1159F PR MEDICATION LIST DOCUMENTED IN MEDICAL RECORD: ICD-10-PCS | Mod: CPTII,S$GLB,, | Performed by: STUDENT IN AN ORGANIZED HEALTH CARE EDUCATION/TRAINING PROGRAM

## 2022-11-14 PROCEDURE — 4010F ACE/ARB THERAPY RXD/TAKEN: CPT | Mod: CPTII,S$GLB,, | Performed by: STUDENT IN AN ORGANIZED HEALTH CARE EDUCATION/TRAINING PROGRAM

## 2022-11-14 PROCEDURE — 99204 OFFICE O/P NEW MOD 45 MIN: CPT | Mod: S$GLB,,, | Performed by: STUDENT IN AN ORGANIZED HEALTH CARE EDUCATION/TRAINING PROGRAM

## 2022-11-14 PROCEDURE — 1159F MED LIST DOCD IN RCRD: CPT | Mod: CPTII,S$GLB,, | Performed by: STUDENT IN AN ORGANIZED HEALTH CARE EDUCATION/TRAINING PROGRAM

## 2022-11-14 PROCEDURE — 3008F BODY MASS INDEX DOCD: CPT | Mod: CPTII,S$GLB,, | Performed by: STUDENT IN AN ORGANIZED HEALTH CARE EDUCATION/TRAINING PROGRAM

## 2022-11-14 PROCEDURE — 99999 PR PBB SHADOW E&M-EST. PATIENT-LVL III: CPT | Mod: PBBFAC,,, | Performed by: STUDENT IN AN ORGANIZED HEALTH CARE EDUCATION/TRAINING PROGRAM

## 2022-11-14 PROCEDURE — 99999 PR PBB SHADOW E&M-EST. PATIENT-LVL III: ICD-10-PCS | Mod: PBBFAC,,, | Performed by: STUDENT IN AN ORGANIZED HEALTH CARE EDUCATION/TRAINING PROGRAM

## 2022-11-14 PROCEDURE — 3008F PR BODY MASS INDEX (BMI) DOCUMENTED: ICD-10-PCS | Mod: CPTII,S$GLB,, | Performed by: STUDENT IN AN ORGANIZED HEALTH CARE EDUCATION/TRAINING PROGRAM

## 2022-11-14 PROCEDURE — 99204 PR OFFICE/OUTPT VISIT, NEW, LEVL IV, 45-59 MIN: ICD-10-PCS | Mod: S$GLB,,, | Performed by: STUDENT IN AN ORGANIZED HEALTH CARE EDUCATION/TRAINING PROGRAM

## 2022-11-14 PROCEDURE — 4010F PR ACE/ARB THEARPY RXD/TAKEN: ICD-10-PCS | Mod: CPTII,S$GLB,, | Performed by: STUDENT IN AN ORGANIZED HEALTH CARE EDUCATION/TRAINING PROGRAM

## 2022-11-14 RX ORDER — AZELASTINE 1 MG/ML
1 SPRAY, METERED NASAL 2 TIMES DAILY
Qty: 30 ML | Refills: 6 | Status: SHIPPED | OUTPATIENT
Start: 2022-11-14 | End: 2024-02-19

## 2022-11-14 RX ORDER — LEVOCETIRIZINE DIHYDROCHLORIDE 5 MG/1
5 TABLET, FILM COATED ORAL DAILY
Qty: 90 TABLET | Refills: 3 | Status: SHIPPED | OUTPATIENT
Start: 2022-11-14 | End: 2023-11-14

## 2022-11-14 NOTE — PATIENT INSTRUCTIONS
Saline rinses or sprays twice daily for dry nose.   Flonase and astelin twice daily.   Allergy pill once to twice daily.   Will refer to Marty Sheridan MD
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
## 2024-02-19 RX ORDER — AZELASTINE 1 MG/ML
1 SPRAY, METERED NASAL 2 TIMES DAILY
Qty: 30 ML | Refills: 6 | Status: SHIPPED | OUTPATIENT
Start: 2024-02-19

## 2024-08-21 ENCOUNTER — OFFICE VISIT (OUTPATIENT)
Dept: URGENT CARE | Facility: CLINIC | Age: 39
End: 2024-08-21
Payer: COMMERCIAL

## 2024-08-21 VITALS
SYSTOLIC BLOOD PRESSURE: 130 MMHG | TEMPERATURE: 98 F | RESPIRATION RATE: 16 BRPM | WEIGHT: 180 LBS | BODY MASS INDEX: 25.2 KG/M2 | DIASTOLIC BLOOD PRESSURE: 88 MMHG | HEIGHT: 71 IN | OXYGEN SATURATION: 97 % | HEART RATE: 93 BPM

## 2024-08-21 DIAGNOSIS — S93.402A SPRAIN OF LEFT ANKLE, UNSPECIFIED LIGAMENT, INITIAL ENCOUNTER: Primary | ICD-10-CM

## 2024-08-21 NOTE — LETTER
[facility] [place] [address], [address]  [facility] [address]  Phone: [phone]  Fax: [phone]  [facility]: [email or web address]    Pt Name: Rigo Garduno  Injury Date: 08/16/2024   Employee ID: [id number] Date of First Treatment: 08/21/2024   Company: [employer]      Appointment Time: 09:40 AM Arrived: 950   Provider: Ricci Ferrer MD Time Out:1110     Office Treatment:   1. Sprain of left ankle, unspecified ligament, initial encounter               Restrictions: Regular Duty (return on 8/26 full duty without restrictions)          You have a work related injury. Medical care and treatment required as a result of a work-related injury  should be focused on restoring functional ability required to meet your daily and work activities and return to work, while striving to restore your health to pre-injury status in so far as is feasible.  If Rx a medication that can be sedating, DO NOT TAKE DURING YOUR WORK DAY.    Some OTC measures to help in recovery(if no allergies to, renal issues or pregnant):  Tylenol 325mg 3x per day  Ibuprofen 400mg 3x per day OR Aleve regular strength one tablet 2x per day  Take Pepcid 20mg BID  If applicable or discussed: Magnesium OTC daily; Topical Voltaren Gel; Lidocaine patches, neoprene OTC compression sleeve  Massage area if possible  Resting of the injured area  Ice for ankle, wrist or elbow injury  Elevation of the injured area if applicable  Heating pad for muscle injury  Stretching/ROM exercises as described in clinic.   ** BE ADVISED: You should be in regular contact with your W/C  to know the status of your claim and /or (if any)pending referrals**

## 2024-08-21 NOTE — PROGRESS NOTES
Subjective:      Patient ID: Rigo Garduno is a 38 y.o. male.    Chief Complaint: Ankle Pain    [facility]  [employer] and patient's job is   Date of initial injury: 8/16/2024  Description of injury: pt states that he was walking down a flight of stairs and rolled his left ankle.  His left foot missed the last step and popped and cracked.  He was given a boot at the hospital.  He states that he wore it up until yesterday, because he does not feel like he needs it anymore.   What have you taken OTC for your symptoms: advil  What is your current pain scale out of 10? 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    Ankle Pain   The incident occurred 5 to 7 days ago. The incident occurred at work. The injury mechanism was a fall. The pain is present in the left ankle. The pain is at a severity of 3/10. The pain is mild. The pain has been Intermittent since onset. He reports no foreign bodies present. Nothing aggravates the symptoms. Treatments tried: advil. The treatment provided mild relief.     ROS  Objective:     Physical Exam   FROM but pain with pull flexion weightbearing.  Assessment:      1. Sprain of left ankle, unspecified ligament, initial encounter      Plan:   Pt feels  that he will be ready on Monday. Stopped wearing boot this AM           Restrictions: Regular Duty (return on 8/26 full duty without restrictions)  No follow-ups on file.